# Patient Record
Sex: FEMALE | Race: WHITE | NOT HISPANIC OR LATINO | URBAN - METROPOLITAN AREA
[De-identification: names, ages, dates, MRNs, and addresses within clinical notes are randomized per-mention and may not be internally consistent; named-entity substitution may affect disease eponyms.]

---

## 2023-08-23 ENCOUNTER — HOSPITAL ENCOUNTER (OUTPATIENT)
Dept: DATA CONVERSION | Facility: HOSPITAL | Age: 23
Discharge: HOME | End: 2023-08-23

## 2023-08-23 DIAGNOSIS — Z11.3 ENCOUNTER FOR SCREENING FOR INFECTIONS WITH A PREDOMINANTLY SEXUAL MODE OF TRANSMISSION: ICD-10-CM

## 2023-08-23 DIAGNOSIS — Z01.419 ENCOUNTER FOR GYNECOLOGICAL EXAMINATION (GENERAL) (ROUTINE) WITHOUT ABNORMAL FINDINGS: ICD-10-CM

## 2023-08-24 LAB
C TRACH RRNA SPEC QL NAA+PROBE: NORMAL
DRUG SCREEN COMMENT UR-IMP: NORMAL
N GONORRHOEA RRNA SPEC QL NAA+PROBE: NORMAL
SPECIMEN SOURCE: NORMAL

## 2025-01-10 ENCOUNTER — OFFICE VISIT (OUTPATIENT)
Dept: DERMATOLOGY | Facility: CLINIC | Age: 25
End: 2025-01-10
Payer: COMMERCIAL

## 2025-01-10 DIAGNOSIS — L70.0 ACNE VULGARIS: Primary | ICD-10-CM

## 2025-01-10 DIAGNOSIS — B07.8 OTHER VIRAL WARTS: ICD-10-CM

## 2025-01-10 PROCEDURE — 99204 OFFICE O/P NEW MOD 45 MIN: CPT

## 2025-01-10 RX ORDER — TRETINOIN 0.25 MG/G
CREAM TOPICAL
Qty: 45 G | Refills: 3 | Status: SHIPPED | OUTPATIENT
Start: 2025-01-10

## 2025-01-10 RX ORDER — CLINDAMYCIN PHOSPHATE 10 UG/ML
LOTION TOPICAL DAILY
Qty: 60 ML | Refills: 3 | Status: SHIPPED | OUTPATIENT
Start: 2025-01-10 | End: 2026-01-10

## 2025-01-10 RX ORDER — BENZOYL PEROXIDE 50 MG/ML
1 LIQUID TOPICAL DAILY
Qty: 237 G | Refills: 3 | Status: SHIPPED | OUTPATIENT
Start: 2025-01-10 | End: 2026-01-10

## 2025-01-10 NOTE — PROGRESS NOTES
Subjective     Bala Painter is a 24 y.o. female who presents for the following: Acne. Patient is here today for acne on her face and neck. Started in April 2024. She is currently using [place] Posay products. She was prescribed Spironolactone back in 2020 or 2021. Was on it for about a year but did not want to take long term. Her acne cleared while taking it. She currently has the Copper IUD- has had for 2 years. Says she occasionally gets acne flares on her chin during her menstrual cycle but in general she does not feel like her periods affect her acne.     She also has a wart on her left 4th toe. Has had for 3 weeks. No prior treatments. Denies pain.       Review of Systems:  No other skin or systemic complaints other than what is documented elsewhere in the note.    The following portions of the chart were reviewed this encounter and updated as appropriate:   Allergies  Meds         Skin Cancer History  No skin cancer on file.      Specialty Problems    None       Objective   Well appearing patient in no apparent distress; mood and affect are within normal limits.    A focused skin examination was performed. All findings within normal limits unless otherwise noted below.    Assessment/Plan   1. Acne vulgaris  Face and neck  Scattered comedones and inflammatory papulopustules.    -Discussed diagnosis of acne  -Discussed expectations for treatment  -Recommend:    -Start Benzoyl Peroxide 5% wash daily in the morning.  -Start Clindamycin 1% lotion daily in the morning.  -Start Tretinoin 0.025% cream daily at bedtime. Start 2-3 times a week and increase as tolerated.     -Patient should discontinue medications if she becomes pregnant or attempts to become pregnant in the future.     -Discussed using a noncomedogenic moisturizer to help with dryness.  -If using a cleanser in the evening, I recommend a gentle cleanser such as Cetaphil, or Cerave.     -Follow up in 3 months.     benzoyl peroxide 5 % external  wash - Face and neck  Apply 1 Application topically once daily. Wash acne prone areas daily in the morning.    clindamycin (Cleocin T) 1 % lotion - Face and neck  Apply topically once daily. Apply to acne prone areas daily in the morning.    tretinoin (Retin-A) 0.025 % cream - Face and neck  Apply a thin layer to clean dry face at bedtime.  Start off 2-3 times a week and increase as tolerated.    2. Other viral warts  Left 4th Toe  Verrucous papule    -Discussed nature of condition  -Discussed treatment options including cryotherapy and 40% salicylic acid. Patient prefers to try 40% salicylic acid at this time. Handout provided at today's visit.           Follow up in 3 months.

## 2025-04-11 ENCOUNTER — APPOINTMENT (OUTPATIENT)
Dept: DERMATOLOGY | Facility: CLINIC | Age: 25
End: 2025-04-11
Payer: COMMERCIAL